# Patient Record
Sex: FEMALE | Race: WHITE | NOT HISPANIC OR LATINO | URBAN - METROPOLITAN AREA
[De-identification: names, ages, dates, MRNs, and addresses within clinical notes are randomized per-mention and may not be internally consistent; named-entity substitution may affect disease eponyms.]

---

## 2019-10-03 ENCOUNTER — HOSPITAL ENCOUNTER (EMERGENCY)
Facility: HOSPITAL | Age: 59
Discharge: HOME | End: 2019-10-04
Attending: EMERGENCY MEDICINE | Admitting: EMERGENCY MEDICINE

## 2019-10-03 DIAGNOSIS — M70.21 OLECRANON BURSITIS OF RIGHT ELBOW: Primary | ICD-10-CM

## 2019-10-03 PROCEDURE — 99283 EMERGENCY DEPT VISIT LOW MDM: CPT

## 2019-10-04 ENCOUNTER — APPOINTMENT (OUTPATIENT)
Dept: GENERAL RADIOLOGY | Facility: HOSPITAL | Age: 59
End: 2019-10-04

## 2019-10-04 VITALS
HEIGHT: 70 IN | OXYGEN SATURATION: 97 % | SYSTOLIC BLOOD PRESSURE: 127 MMHG | RESPIRATION RATE: 18 BRPM | WEIGHT: 208 LBS | HEART RATE: 65 BPM | BODY MASS INDEX: 29.78 KG/M2 | TEMPERATURE: 98.5 F | DIASTOLIC BLOOD PRESSURE: 65 MMHG

## 2019-10-04 PROCEDURE — 73070 X-RAY EXAM OF ELBOW: CPT

## 2019-10-04 PROCEDURE — 25010000002 DEXAMETHASONE PER 1 MG: Performed by: EMERGENCY MEDICINE

## 2019-10-04 RX ORDER — NAPROXEN 500 MG/1
500 TABLET ORAL 2 TIMES DAILY WITH MEALS
Qty: 20 TABLET | Refills: 0 | Status: SHIPPED | OUTPATIENT
Start: 2019-10-04

## 2019-10-04 RX ORDER — HYDROCODONE BITARTRATE AND ACETAMINOPHEN 5; 325 MG/1; MG/1
1 TABLET ORAL ONCE
Status: COMPLETED | OUTPATIENT
Start: 2019-10-04 | End: 2019-10-04

## 2019-10-04 RX ORDER — SULFAMETHOXAZOLE AND TRIMETHOPRIM 800; 160 MG/1; MG/1
2 TABLET ORAL 2 TIMES DAILY
Qty: 40 TABLET | Refills: 0 | Status: SHIPPED | OUTPATIENT
Start: 2019-10-04 | End: 2019-10-14

## 2019-10-04 RX ORDER — HYDROCODONE BITARTRATE AND ACETAMINOPHEN 5; 325 MG/1; MG/1
1 TABLET ORAL EVERY 4 HOURS PRN
Qty: 10 TABLET | Refills: 0 | Status: SHIPPED | OUTPATIENT
Start: 2019-10-04

## 2019-10-04 RX ORDER — SULFAMETHOXAZOLE AND TRIMETHOPRIM 800; 160 MG/1; MG/1
2 TABLET ORAL ONCE
Status: COMPLETED | OUTPATIENT
Start: 2019-10-04 | End: 2019-10-04

## 2019-10-04 RX ORDER — CEPHALEXIN 500 MG/1
500 CAPSULE ORAL 4 TIMES DAILY
Qty: 40 CAPSULE | Refills: 0 | Status: SHIPPED | OUTPATIENT
Start: 2019-10-04 | End: 2019-10-14

## 2019-10-04 RX ADMIN — DEXAMETHASONE SODIUM PHOSPHATE 10 MG: 10 INJECTION INTRAMUSCULAR; INTRAVENOUS at 01:13

## 2019-10-04 RX ADMIN — HYDROCODONE BITARTRATE AND ACETAMINOPHEN 1 TABLET: 5; 325 TABLET ORAL at 01:13

## 2019-10-04 RX ADMIN — SULFAMETHOXAZOLE AND TRIMETHOPRIM 320 MG: 800; 160 TABLET ORAL at 01:12

## 2019-10-04 NOTE — ED PROVIDER NOTES
[facility] EMERGENCY DEPARTMENT    eMERGENCY dEPARTMENT eNCOUnter      Pt Name: Rosaline Smith  MRN: [id number]  YOB: 1960  Date of evaluation: 10/3/2019  Provider: Samson Nunez DO    CHIEF COMPLAINT       Chief Complaint   Patient presents with   • Joint Swelling         HISTORY OF PRESENT ILLNESS  (Location/Symptom, Timing/Onset, Context/Setting, Quality, Duration, Modifying Factors, Severity.)   Rosaline Smith is a 59 y.o. female who presents to the emergency department for evaluation of right elbow inflammation overlying the olecranon bursa.  She states is worsened throughout the day today.  No known fall, injury or trauma to the site, no open wound or laceration.  She denies any history of recurrent bursal infections.  She denies any fevers or chills, no numbness or tingling distally.  She has not taken any medications for this, she placed ice pack over the area earlier today with some limited relief.  Denies any chest pain difficulty breathing, no recent illness.  She has no other acute systemic complaints at this time.  Does have history of underlying arthritis, no other rheumatological disorders.      Nursing notes were reviewed.    REVIEW OF SYSTEMS    (2-9 systems for level 4, 10 or more for level 5)   ROS:  General:  No fevers, no chills, no weakness  Cardiovascular:  No chest pain, no palpitations  Respiratory:  No shortness of breath, no cough, no wheezing  Gastrointestinal:  No pain, no nausea, no vomiting, no diarrhea  Musculoskeletal:  No muscle pain, + right elbow olecranon bursa pain  Skin:  No rash, no easy bruising  Neurologic:  No speech problems, no headache, no extremity numbness, no extremity tingling, no extremity weakness  Psychiatric:  No anxiety  Genitourinary:  No dysuria, no hematuria    Except as noted above the remainder of the review of systems was reviewed and negative.       PAST MEDICAL HISTORY   History reviewed. No pertinent past medical  "history.      SURGICAL HISTORY       Past Surgical History:   Procedure Laterality Date   • KNEE SURGERY     • SHOULDER SURGERY           CURRENT MEDICATIONS     No current facility-administered medications for this encounter.     Current Outpatient Medications:   •  cephalexin (KEFLEX) 500 MG capsule, Take 1 capsule by mouth 4 (Four) Times a Day for 10 days., Disp: 40 capsule, Rfl: 0  •  HYDROcodone-acetaminophen (NORCO) 5-325 MG per tablet, Take 1 tablet by mouth Every 4 (Four) Hours As Needed for Moderate Pain ., Disp: 10 tablet, Rfl: 0  •  naproxen (NAPROSYN) 500 MG tablet, Take 1 tablet by mouth 2 (Two) Times a Day With Meals., Disp: 20 tablet, Rfl: 0  •  sulfamethoxazole-trimethoprim (BACTRIM DS,SEPTRA DS) 800-160 MG per tablet, Take 2 tablets by mouth 2 (Two) Times a Day for 10 days., Disp: 40 tablet, Rfl: 0    ALLERGIES     Patient has no known allergies.    FAMILY HISTORY     History reviewed. No pertinent family history.       SOCIAL HISTORY       Social History     Socioeconomic History   • Marital status:      Spouse name: Not on file   • Number of children: Not on file   • Years of education: Not on file   • Highest education level: Not on file   Tobacco Use   • Smoking status: Never Smoker   Substance and Sexual Activity   • Alcohol use: Yes     Comment: OCCASIONALLY   • Drug use: No         PHYSICAL EXAM    (up to 7 for level 4, 8 or more for level 5)     Vitals:    10/03/19 2218   BP: 141/79   BP Location: Left arm   Patient Position: Sitting   Pulse: 87   Resp: 18   Temp: 98.5 °F (36.9 °C)   TempSrc: Oral   SpO2: 95%   Weight: 94.3 kg (208 lb)   Height: 177.8 cm (70\")       Physical Exam  General :Patient is awake, alert, oriented, in no acute distress, nontoxic appearing  HEENT: Pupils are equally round and reactive to light, EOMI, conjunctivae clear, sclerae white, there is no injection no icterus.  Oral mucosa is moist, no exudate. Uvula is midline  Neck: Neck is supple, full range of " motion, trachea midline  Cardiac: Heart regular rate, rhythm, no murmurs, rubs, or gallops  Lungs: Lungs are clear to auscultation, there is no wheezing, rhonchi, or rales. There is no use of accessory muscles.  Abdomen: Abdomen is soft, nontender, nondistended. There is no firm or pulsatile masses, no rebound rigidity or guarding.   Musculoskeletal: Overlying elective  in the right elbow there is an area of inflammation, swelling noted to the olecranon bursa which is tender to palpation, is no open wound, no palpable significant induration or loculation, the patient is able to fully extend, has limited flexion past 100 degrees due to discomfort.  Distal pulses neurovascular status intact, there is no signs of any streaking or tenderness to palpation of the joint above and below the right elbow olecranon.  5 out of 5 strength in all remaining extremities.  No focal muscle deficits are appreciated  Neuro: Motor intact, sensory intact, level of consciousness is normal  Dermatology: Skin is warm and dry  Psych: Mentation is grossly normal, cognition is grossly normal. Affect is appropriate.      DIAGNOSTIC RESULTS     EKG: All EKG's are interpreted by the Emergency Department Physician who either signs or Co-signs this chart in the absence of a cardiologist.    No orders to display       RADIOLOGY:   Non-plain film images such as CT, Ultrasound and MRI are read by the radiologist. Plain radiographic images are visualized and preliminarily interpreted by the emergency physician with the below findings:      [] Radiologist's Report Reviewed:  XR Elbow 2 View Right   Final Result   Posterior soft tissue swelling otherwise normal      Signer Name: Holland Johnson MD    Signed: 10/4/2019 1:38 AM    Workstation Name: [email or web address]     [facility]            ED BEDSIDE ULTRASOUND:   Performed by ED Physician - none    LABS:    I have reviewed and interpreted all of the currently available lab results  "from this visit (if applicable):  No results found for this or any previous visit.     All other labs were within normal range or not returned as of this dictation.      EMERGENCY DEPARTMENT COURSE and DIFFERENTIAL DIAGNOSIS/MDM:   Vitals:    Vitals:    10/03/19 2218   BP: 141/79   BP Location: Left arm   Patient Position: Sitting   Pulse: 87   Resp: 18   Temp: 98.5 °F (36.9 °C)   TempSrc: Oral   SpO2: 95%   Weight: 94.3 kg (208 lb)   Height: 177.8 cm (70\")        !    Patient with a right elbow olecranon bursitis, no injury or trauma, does not appear acutely ill or toxic, she is afebrile, the area is slightly red, tender to palpation.  We discussed the possibility of inflammatory versus infective bursitis, coverage for both given her redness over the area which we will include Bactrim, Keflex, also with anti-inflammatory medication for symptomatic control, pain relief as needed.  We will place her in a sling for comfort and to allow for healing.  The patient did have x-rays completed of her right elbow.  We discussed close monitoring over the next few days, reevaluation back in the ED if her symptoms worsen, she has any fevers, unable to tolerate antibiotics or she has any further concerns.  Patient is agreeable to outpatient work-up at this time which I feel is appropriate she does not have any other significant comorbidities. The patient will follow-up with their PCP in 1-2 days for reevaluation.  If the patient or family members have any further concerns or patient has any worsening symptoms they will return to the ED for reevaluation.      MEDICATIONS ADMINISTERED IN ED:  Medications   sulfamethoxazole-trimethoprim (BACTRIM DS,SEPTRA DS) 800-160 MG per tablet 320 mg (320 mg Oral Given 10/4/19 0112)   dexamethasone (DECADRON) 10 MG/ML oral solution 10 mg (10 mg Oral Given 10/4/19 0113)   HYDROcodone-acetaminophen (NORCO) 5-325 MG per tablet 1 tablet (1 tablet Oral Given 10/4/19 0113) "       PROCEDURES:  Procedures    CRITICAL CARE TIME    Total Critical Care time was 0 minutes, excluding separately reportable procedures.   There was a high probability of clinically significant/life threatening deterioration in the patient's condition which required my urgent intervention.      FINAL IMPRESSION      1. Olecranon bursitis of right elbow          DISPOSITION/PLAN     ED Disposition     ED Disposition Condition Comment    Discharge Stable           PATIENT REFERRED TO:  Your PCP back in [place]    In 3 days  For wound re-check    [facility] Emergency Department  [address] [address] [phone]    If symptoms worsen      DISCHARGE MEDICATIONS:     Medication List      START taking these medications    cephalexin 500 MG capsule  Commonly known as:  KEFLEX  Take 1 capsule by mouth 4 (Four) Times a Day for 10 days.     HYDROcodone-acetaminophen 5-325 MG per tablet  Commonly known as:  NORCO  Take 1 tablet by mouth Every 4 (Four) Hours As Needed for Moderate Pain .     naproxen 500 MG tablet  Commonly known as:  NAPROSYN  Take 1 tablet by mouth 2 (Two) Times a Day With Meals.     sulfamethoxazole-trimethoprim 800-160 MG per tablet  Commonly known as:  BACTRIM DS,SEPTRA DS  Take 2 tablets by mouth 2 (Two) Times a Day for 10 days.            Comment: Please note this report has been produced using speech recognition software.      Samson Nunez DO  Attending Emergency Physician               Samson Nunez,   10/04/19 0136       Samson Nunez,   10/04/19 0144